# Patient Record
Sex: MALE | Race: WHITE | NOT HISPANIC OR LATINO | Employment: FULL TIME | URBAN - METROPOLITAN AREA
[De-identification: names, ages, dates, MRNs, and addresses within clinical notes are randomized per-mention and may not be internally consistent; named-entity substitution may affect disease eponyms.]

---

## 2018-12-17 ENCOUNTER — OFFICE VISIT (OUTPATIENT)
Dept: URGENT CARE | Facility: CLINIC | Age: 43
End: 2018-12-17
Payer: COMMERCIAL

## 2018-12-17 VITALS
RESPIRATION RATE: 18 BRPM | SYSTOLIC BLOOD PRESSURE: 140 MMHG | HEART RATE: 77 BPM | TEMPERATURE: 98.9 F | OXYGEN SATURATION: 96 % | WEIGHT: 233 LBS | HEIGHT: 72 IN | DIASTOLIC BLOOD PRESSURE: 93 MMHG | BODY MASS INDEX: 31.56 KG/M2

## 2018-12-17 DIAGNOSIS — J06.9 ACUTE URI: Primary | ICD-10-CM

## 2018-12-17 PROCEDURE — 99213 OFFICE O/P EST LOW 20 MIN: CPT | Performed by: NURSE PRACTITIONER

## 2018-12-17 RX ORDER — FLUTICASONE PROPIONATE 50 MCG
1 SPRAY, SUSPENSION (ML) NASAL 2 TIMES DAILY
Qty: 16 G | Refills: 0 | Status: SHIPPED | OUTPATIENT
Start: 2018-12-17 | End: 2019-09-23

## 2018-12-17 NOTE — PATIENT INSTRUCTIONS
-Use Neti Pot 2 times per day x 5 days, prior to using the prescribed Flonase   -Use Flonase as prescribed 2 times per day x 5 days after using Neti Pot   -Use cough medicine as per instructions on box as needed  If you were prescribed a cough medicine take it as prescribed  It may cause drowsiness so take it at home only or before bed  I-f using several medications be sure to read all of the ingredients so you are not taking too many of the same medications   -Take Tylenol/Ibuprofen as needed  -Stay hydrated, drink lots of fluids, soups, and tea with honey   -Warm salt water gargles may help with sore throat  -Use cool or warm humidifier   -Your symptoms may last up to 14 days, continue supportive therapy as needed   -Follow up with your PCP if your symptoms become worse or do not improve  -If you have difficulty breathing, can not catch your breath or feel short of breath go directly to the emergency room  Acute Cough   WHAT YOU NEED TO KNOW:   What is an acute cough? An acute cough can last up to 3 weeks  Common causes of an acute cough include a cold, allergies, or a lung infection  How is the cause of an acute cough diagnosed? Your healthcare provider will examine you and listen to your lungs  Tell your healthcare provider if you cough up any mucus, or have a fever or shortness of breath  Also tell your provider what makes the cough better or worse  Depending on your symptoms, you may need a chest x-ray  A sample of mucus may be collected and tested for infection  How is an acute cough treated? An acute cough usually goes away on its own  Ask your healthcare provider about medicines you can take to decrease your cough  You may need medicine to stop the cough, decrease swelling in your airways, or help open your airways  Medicine may also be given to help you cough up mucus  If you have an infection caused by bacteria, you may need antibiotics  What can I do to manage my cough?    · Do not smoke and stay away from others who smoke  Nicotine and other chemicals in cigarettes and cigars can cause lung damage and make your cough worse  Ask your healthcare provider for information if you currently smoke and need help to quit  E-cigarettes or smokeless tobacco still contain nicotine  Talk to your healthcare provider before you use these products  · Drink extra liquids as directed  Liquids will help thin and loosen mucus so you can cough it up  Liquids will also help prevent dehydration  Examples of good liquids to drink include water, fruit juice, and broth  Do not drink liquids that contain caffeine  Caffeine can increase your risk for dehydration  Ask your healthcare provider how much liquid to drink each day  · Rest as directed  Do not do activities that make your cough worse, such as exercise  · Use a humidifier or vaporizer  Use a cool mist humidifier or a vaporizer to increase air moisture in your home  This may make it easier for you to breathe and help decrease your cough  · Eat 2 to 5 mL of honey 2 times each day  Honey can help thin mucus and decrease your cough  · Use cough drops or lozenges  These can help decrease throat irritation and your cough  When should I seek immediate care? · You have trouble breathing or feel short of breath  · You cough up blood, or you see blood in your mucus  · You faint or feel weak or dizzy  · You have chest pain when you cough or take a deep breath  · You have new wheezing  When should I contact my healthcare provider? · You have a fever  · Your cough lasts longer than 4 weeks  · Your symptoms do not improve with treatment  · You have questions or concerns about your condition or care  CARE AGREEMENT:   You have the right to help plan your care  Learn about your health condition and how it may be treated  Discuss treatment options with your caregivers to decide what care you want to receive   You always have the right to refuse treatment  The above information is an  only  It is not intended as medical advice for individual conditions or treatments  Talk to your doctor, nurse or pharmacist before following any medical regimen to see if it is safe and effective for you  © 2017 [employer] Information is for End User's use only and may not be sold, redistributed or otherwise used for commercial purposes  All illustrations and images included in CareNotes® are the copyrighted property of A D A M , Inc  or Mcihael Francois

## 2019-09-23 ENCOUNTER — OFFICE VISIT (OUTPATIENT)
Dept: URGENT CARE | Facility: CLINIC | Age: 44
End: 2019-09-23
Payer: COMMERCIAL

## 2019-09-23 VITALS
WEIGHT: 218.8 LBS | OXYGEN SATURATION: 100 % | BODY MASS INDEX: 29.64 KG/M2 | HEART RATE: 80 BPM | SYSTOLIC BLOOD PRESSURE: 110 MMHG | DIASTOLIC BLOOD PRESSURE: 78 MMHG | RESPIRATION RATE: 18 BRPM | HEIGHT: 72 IN | TEMPERATURE: 98.9 F

## 2019-09-23 DIAGNOSIS — H60.332 ACUTE SWIMMER'S EAR OF LEFT SIDE: Primary | ICD-10-CM

## 2019-09-23 PROCEDURE — 99213 OFFICE O/P EST LOW 20 MIN: CPT | Performed by: PHYSICIAN ASSISTANT

## 2019-09-23 RX ORDER — OFLOXACIN 3 MG/ML
10 SOLUTION AURICULAR (OTIC) DAILY
Qty: 5 ML | Refills: 0 | Status: SHIPPED | OUTPATIENT
Start: 2019-09-23 | End: 2019-09-30

## 2019-09-23 NOTE — PATIENT INSTRUCTIONS
rx ofloxicin sent via EMR, 10 drops in L ear once daily x 7 days  Tylenol/advil as needed for pain  Follow up with PCP in 3-5 days  Proceed to  ER if symptoms worsen  Otitis Externa   WHAT YOU NEED TO KNOW:   Otitis externa, or swimmer's ear, is an infection in the outer ear canal  This canal goes from the outside of the ear to the eardrum  DISCHARGE INSTRUCTIONS:   Return to the emergency department if:   · You have severe ear pain  · You are suddenly unable to hear at all  · You have new swelling in your face, behind your ears, or in your neck  · You suddenly cannot move part of your face  · Your face suddenly feels numb  Contact your healthcare provider if:   · You have a fever  · Your signs and symptoms do not get better after 2 days of treatment  · Your signs and symptoms go away for a time, but then come back  · You have questions or concerns about your condition or care  Medicines:   · NSAIDs , such as ibuprofen, help decrease swelling, pain, and fever  This medicine is available with or without a doctor's order  NSAIDs can cause stomach bleeding or kidney problems in certain people  If you take blood thinner medicine, always ask if NSAIDs are safe for you  Always read the medicine label and follow directions  Do not give these medicines to children under 10months of age without direction from your child's healthcare provider  · Acetaminophen  decreases pain and fever  It is available without a doctor's order  Ask how much to take and how often to take it  Follow directions  Acetaminophen can cause liver damage if not taken correctly  · Ear drops  that contain an antibiotic may be given  The antibiotic helps treat a bacterial infection  You may also be given steroid medicine  The steroid helps decrease redness, swelling, and pain  · Take your medicine as directed    Contact your healthcare provider if you think your medicine is not helping or if you have side effects  Tell him or her if you are allergic to any medicine  Keep a list of the medicines, vitamins, and herbs you take  Include the amounts, and when and why you take them  Bring the list or the pill bottles to follow-up visits  Carry your medicine list with you in case of an emergency  Follow up with your healthcare provider as directed:  Write down your questions so you remember to ask them during your visits  How to use eardrops:   · Lie down on your side with your infected ear facing up  · Carefully drip the correct number of eardrops into your ear  Have another person help you if possible  · Gently move the outside part of your ear back and forth to help the medicine reach your ear canal      · Stay lying down in the same position (with your ear facing up) for 3 to 5 minutes  Prevent otitis externa:   · Do not put cotton swabs or foreign objects in your ears  · Wrap a clean moist washcloth around your finger, and use it to clean your outer ear and remove extra ear wax  · Use ear plugs when you swim  Dry your outer ears completely after you swim or bathe  © 2017 [facility] Information is for End User's use only and may not be sold, redistributed or otherwise used for commercial purposes  All illustrations and images included in CareNotes® are the copyrighted property of A D A M , Inc  or Michael Francois  The above information is an  only  It is not intended as medical advice for individual conditions or treatments  Talk to your doctor, nurse or pharmacist before following any medical regimen to see if it is safe and effective for you

## 2019-09-23 NOTE — PROGRESS NOTES
[facility]    NAME: Lance Johns is a 45 y o  male  : 1975    MRN: [id number]  DATE: 2019  TIME: 12:08 PM    Assessment and Plan   Acute swimmer's ear of left side [H60 332]  1  Acute swimmer's ear of left side  ofloxacin (FLOXIN OTIC) 0 3 % otic solution         Patient Instructions   rx ofloxicin sent via EMR, 10 drops in L ear once daily x 7 days  Tylenol/advil as needed for pain  Follow up with PCP in 3-5 days  Proceed to  ER if symptoms worsen  Chief Complaint     Chief Complaint   Patient presents with   [employer]     Swimming in [place] - L ear pain x 4 days and noted ear canal feeling wet  No fever  No OTC meds  History of Present Illness       Jaime Howe is a 44 y/o male who presents to the clinic c/o L ear pain x 4 days  Patient states that he was swimming in [place] last week and when he returned home started to notice the L ear pain, mild at first and now more sharp  He also states that last night after laying with L ear down, when he got up he noticed pillow wet, no color to drainage  He denies any fever, chills, hearing difficulties, throat pain, or R ear pain  He has h/o L ear problems for which he sees ENT for, so he didn't want to let this possible infection go due to his history  Review of Systems   Review of Systems   Constitutional: Negative for chills and fever  HENT: Positive for ear discharge and ear pain  Negative for congestion and sore throat  Respiratory: Negative for cough and shortness of breath  Neurological: Negative for dizziness, light-headedness and headaches       Current Medications       Current Outpatient Medications:     ofloxacin (FLOXIN OTIC) 0 3 % otic solution, Administer 10 drops into the left ear daily for 7 days, Disp: 5 mL, Rfl: 0    Current Allergies     Allergies as of 2019    (No Known Allergies)            The following portions of the patient's history were reviewed and updated as appropriate: allergies, current medications, past family history, past medical history, past social history, past surgical history and problem list      Past Medical History:   Diagnosis Date    Allergic rhinitis     Otitis media        Past Surgical History:   Procedure Laterality Date    HERNIA REPAIR         History reviewed  No pertinent family history  Medications have been verified  Objective   /78 (BP Location: Left arm, Patient Position: Sitting, Cuff Size: Standard)   Pulse 80   Temp 98 9 °F (37 2 °C) (Tympanic)   Resp 18   Ht 6' (1 829 m)   Wt 99 2 kg (218 lb 12 8 oz)   SpO2 100%   BMI 29 67 kg/m²        Physical Exam     Physical Exam   Constitutional: He appears well-developed and well-nourished  No distress  HENT:   Right Ear: Hearing, tympanic membrane, external ear and ear canal normal    Left Ear: Hearing, tympanic membrane and external ear normal  No lacerations  No drainage or swelling  No foreign bodies  Tympanic membrane is not erythematous and not bulging  No middle ear effusion  L ear canal erythematous   Cardiovascular: Normal rate, regular rhythm and normal heart sounds  Pulmonary/Chest: Effort normal and breath sounds normal    Nursing note and vitals reviewed

## 2020-07-29 ENCOUNTER — TELEPHONE (OUTPATIENT)
Dept: FAMILY MEDICINE CLINIC | Facility: CLINIC | Age: 45
End: 2020-07-29

## 2021-03-01 ENCOUNTER — NURSE TRIAGE (OUTPATIENT)
Dept: OTHER | Facility: OTHER | Age: 46
End: 2021-03-01

## 2021-03-01 DIAGNOSIS — Z20.822 SUSPECTED SEVERE ACUTE RESPIRATORY SYNDROME CORONAVIRUS 2 (SARS-COV-2) INFECTION: ICD-10-CM

## 2021-03-01 DIAGNOSIS — Z20.822 SUSPECTED SEVERE ACUTE RESPIRATORY SYNDROME CORONAVIRUS 2 (SARS-COV-2) INFECTION: Primary | ICD-10-CM

## 2021-03-01 PROCEDURE — U0003 INFECTIOUS AGENT DETECTION BY NUCLEIC ACID (DNA OR RNA); SEVERE ACUTE RESPIRATORY SYNDROME CORONAVIRUS 2 (SARS-COV-2) (CORONAVIRUS DISEASE [COVID-19]), AMPLIFIED PROBE TECHNIQUE, MAKING USE OF HIGH THROUGHPUT TECHNOLOGIES AS DESCRIBED BY CMS-2020-01-R: HCPCS | Performed by: FAMILY MEDICINE

## 2021-03-01 PROCEDURE — U0005 INFEC AGEN DETEC AMPLI PROBE: HCPCS | Performed by: FAMILY MEDICINE

## 2021-03-01 NOTE — TELEPHONE ENCOUNTER
Reason for Disposition   [1] COVID-19 infection suspected by caller or triager AND [2] mild symptoms (cough, fever, or others) AND [8] no complications or SOB    Protocols used: CORONAVIRUS (COVID-19) DIAGNOSED OR SUSPECTED-ADULT-[place]

## 2021-03-01 NOTE — TELEPHONE ENCOUNTER
Regarding: covid symptomatic - fever  ----- Message from [employer] sent at 3/1/2021  1:38 PM EST -----  covid test requested - patient has fever, stuffy headache and low energy   No known exposure but works in [place] [employer] so he's around a lot of people

## 2021-03-01 NOTE — TELEPHONE ENCOUNTER
1   Were you within 6 feet or less, for up to 15 minutes or more with a person that has a confirmed COVID-19 test?    Denied known exposure  2  What was the date of your exposure? N/A    3  Are you experiencing any symptoms attributed to the virus?  (Assess for SOB, cough, fever, difficulty breathing) Started 2/27/2021  Headache  Fatigue  Nasal and sinus congestion  Intermittent fever  99 9 (temporal) @ 1000 today  4   HIGH RISK: Do you have any history heart or lung conditions, weakened immune system, diabetes, Asthma, CHF, HIV, COPD, Chemo, renal failure, sickle cell, etc?   Denied

## 2021-03-02 ENCOUNTER — TELEPHONE (OUTPATIENT)
Dept: OTHER | Facility: OTHER | Age: 46
End: 2021-03-02

## 2021-03-02 LAB — SARS-COV-2 RNA RESP QL NAA+PROBE: NEGATIVE

## 2021-03-02 NOTE — TELEPHONE ENCOUNTER
The patient was called for notification of a test result for COVID-19  The patient did not answer the phone and a voicemail was left requesting a call back to [phone], Option 7

## 2021-03-03 ENCOUNTER — OFFICE VISIT (OUTPATIENT)
Dept: URGENT CARE | Facility: CLINIC | Age: 46
End: 2021-03-03
Payer: COMMERCIAL

## 2021-03-03 ENCOUNTER — TELEPHONE (OUTPATIENT)
Dept: OTHER | Facility: OTHER | Age: 46
End: 2021-03-03

## 2021-03-03 VITALS
HEART RATE: 83 BPM | HEIGHT: 72 IN | DIASTOLIC BLOOD PRESSURE: 75 MMHG | SYSTOLIC BLOOD PRESSURE: 128 MMHG | OXYGEN SATURATION: 98 % | WEIGHT: 234 LBS | BODY MASS INDEX: 31.69 KG/M2 | TEMPERATURE: 97.5 F | RESPIRATION RATE: 16 BRPM

## 2021-03-03 DIAGNOSIS — Z76.89 RETURN TO WORK EVALUATION: Primary | ICD-10-CM

## 2021-03-03 PROCEDURE — 99213 OFFICE O/P EST LOW 20 MIN: CPT | Performed by: PHYSICIAN ASSISTANT

## 2021-03-03 PROCEDURE — 3008F BODY MASS INDEX DOCD: CPT | Performed by: PHYSICIAN ASSISTANT

## 2021-03-03 NOTE — PROGRESS NOTES
[email or web address]        NAME: Cora Cheadle is a 39 y o  male  : 1975    MRN: [id number]  DATE: March 3, 2021  TIME: 3:10 PM    Assessment and Plan   Return to work evaluation [Z76 89]  1  Return to work evaluation         Patient Instructions     COVID 19 testing from 3/1/2021 reviewed, negative  As pt has had no recent exposures to COVID 19 positive persons and has had negative covid 19 testing, he may return to work  Work note provided  All questions answered  Precautions given  Follow up with PCP in 3-5 days  Proceed to  ER if symptoms worsen  Chief Complaint     Chief Complaint   Patient presents with    COVID-19     Evaluation for post covid exposure and work note  History of Present Illness       38 y/o male presenting for return to work note  Pt states he began to feel unwell 3 days ago with fever, tmax 100, fatigue, headache, and body aches  He reports slight and intermittent NC and runny nose  No chills, sweats, ear pain, sore throat, cough, or stomach upset  Reports fever last 24 hours and has since been feeling better  He had COVID 19 testing on day following sx onset that was negative  Requires an in office evaluation and note to return to work  Denies any exposure to COVID 19 positive persons  No recent travel  Review of Systems   Review of Systems   Constitutional: Positive for fatigue and fever  Negative for chills and diaphoresis  HENT: Positive for congestion and rhinorrhea  Negative for ear pain and sore throat  Respiratory: Negative for cough, shortness of breath and wheezing  Gastrointestinal: Negative for abdominal pain, diarrhea, nausea and vomiting  Musculoskeletal: Positive for myalgias  Neurological: Positive for headaches  Negative for dizziness  Current Medications     No current outpatient medications on file      Current Allergies     Allergies as of 2021    (No Known Allergies)            The following portions of the patient's history were reviewed and updated as appropriate: allergies, current medications, past family history, past medical history, past social history, past surgical history and problem list      Past Medical History:   Diagnosis Date    Allergic rhinitis     Otitis media        Past Surgical History:   Procedure Laterality Date    HERNIA REPAIR         Family History   Problem Relation Age of Onset    Asthma Mother          Medications have been verified  Objective   /75   Pulse 83   Temp 97 5 °F (36 4 °C)   Resp 16   Ht 6' (1 829 m)   Wt 106 kg (234 lb)   SpO2 98%   BMI 31 74 kg/m²   No LMP for male patient  Physical Exam     Physical Exam  Vitals signs and nursing note reviewed  Constitutional:       General: He is not in acute distress  Appearance: He is well-developed  He is not ill-appearing or diaphoretic  HENT:      Head: Normocephalic and atraumatic  Right Ear: Hearing, tympanic membrane, ear canal and external ear normal       Left Ear: Hearing, tympanic membrane, ear canal and external ear normal       Nose: Nose normal       Mouth/Throat:      Mouth: Mucous membranes are not pale, not dry and not cyanotic  No oral lesions  Pharynx: Uvula midline  No oropharyngeal exudate, posterior oropharyngeal erythema or uvula swelling  Tonsils: No tonsillar abscesses  Eyes:      General: Lids are normal          Right eye: No discharge  Left eye: No discharge  Conjunctiva/sclera: Conjunctivae normal       Right eye: No exudate  Left eye: No exudate  Neck:      Musculoskeletal: Neck supple  No edema, erythema or neck rigidity  Trachea: Trachea and phonation normal  No tracheal tenderness  Cardiovascular:      Rate and Rhythm: Normal rate and regular rhythm  Heart sounds: Normal heart sounds  Heart sounds not distant  Pulmonary:      Effort: Pulmonary effort is normal  No respiratory distress  Breath sounds: Normal breath sounds   No stridor  No decreased breath sounds, wheezing, rhonchi or rales  Lymphadenopathy:      Cervical: No cervical adenopathy  Skin:     General: Skin is warm and dry  Coloration: Skin is not pale  Findings: No erythema or rash  Neurological:      General: No focal deficit present  Mental Status: He is alert  He is not disoriented  Cranial Nerves: No cranial nerve deficit  Coordination: Coordination normal       Gait: Gait normal    Psychiatric:         Attention and Perception: Attention normal          Behavior: Behavior normal  Behavior is cooperative  Thought Content:  Thought content normal          Judgment: Judgment normal

## 2021-03-03 NOTE — LETTER
March 3, 2021    Patient:  Mary Wallace  YOB: 1975  Date of Last Encounter: 3/3/2021    To whom it may concern:    Mary Wallace was first evaluated on 3/1/2021 and tested negative for COVID-19 (Coronavirus)  He was advised to remain at home in quarantine pending these results  He was seen in our office on 3/3/2021 and may return to work on 3/4/2021        Sincerely,        Emily Spatz, PA-C

## 2021-03-03 NOTE — TELEPHONE ENCOUNTER
Patient states he needs proof of the conversation he had with a healthcall nurse on Monday 3/1 in order to return to work  His employer is not satisfied with the results of the actual covid swab test which [facility]  Patient states he got the swab at the care now in [place] on Monday  Patient states his employer is threatening to make him take 3 days unpaid unless he can get evidence of this phone encounter with the nurse  Please give him a call 
Pt advised to log into [employer] from a computer to view and print after visit summary from [facility] encounter  Pt was able to log in and find the additional documentation to present to work 
19-Dec-2018

## 2021-04-09 RX ORDER — SODIUM CHLORIDE 9 MG/ML
INJECTION, SOLUTION INTRAVENOUS
COMMUNITY
End: 2021-04-12 | Stop reason: ALTCHOICE

## 2021-04-09 RX ORDER — DICYCLOMINE HYDROCHLORIDE 10 MG/1
CAPSULE ORAL EVERY 8 HOURS
COMMUNITY
End: 2021-04-12 | Stop reason: ALTCHOICE

## 2021-04-12 ENCOUNTER — OFFICE VISIT (OUTPATIENT)
Dept: FAMILY MEDICINE CLINIC | Facility: CLINIC | Age: 46
End: 2021-04-12
Payer: COMMERCIAL

## 2021-04-12 VITALS
HEIGHT: 72 IN | HEART RATE: 68 BPM | BODY MASS INDEX: 31.83 KG/M2 | DIASTOLIC BLOOD PRESSURE: 70 MMHG | RESPIRATION RATE: 16 BRPM | SYSTOLIC BLOOD PRESSURE: 122 MMHG | OXYGEN SATURATION: 98 % | WEIGHT: 235 LBS

## 2021-04-12 DIAGNOSIS — E55.9 VITAMIN D DEFICIENCY: ICD-10-CM

## 2021-04-12 DIAGNOSIS — Z28.21 COVID-19 VIRUS VACCINATION DECLINED: ICD-10-CM

## 2021-04-12 DIAGNOSIS — Z00.00 WELL ADULT EXAM: Primary | ICD-10-CM

## 2021-04-12 DIAGNOSIS — Z79.899 OTHER LONG TERM (CURRENT) DRUG THERAPY: ICD-10-CM

## 2021-04-12 DIAGNOSIS — Z11.4 SCREENING FOR HIV (HUMAN IMMUNODEFICIENCY VIRUS): ICD-10-CM

## 2021-04-12 DIAGNOSIS — E66.09 CLASS 1 OBESITY DUE TO EXCESS CALORIES WITH SERIOUS COMORBIDITY AND BODY MASS INDEX (BMI) OF 31.0 TO 31.9 IN ADULT: ICD-10-CM

## 2021-04-12 DIAGNOSIS — K59.4 ANAL SPHINCTER SPASM: ICD-10-CM

## 2021-04-12 DIAGNOSIS — E53.8 B12 DEFICIENCY: ICD-10-CM

## 2021-04-12 PROCEDURE — 1036F TOBACCO NON-USER: CPT | Performed by: FAMILY MEDICINE

## 2021-04-12 PROCEDURE — 99386 PREV VISIT NEW AGE 40-64: CPT | Performed by: FAMILY MEDICINE

## 2021-04-12 PROCEDURE — 3725F SCREEN DEPRESSION PERFORMED: CPT | Performed by: FAMILY MEDICINE

## 2021-04-12 NOTE — PROGRESS NOTES
Assessment/Plan:    1  Well adult exam  -     TSH, 3rd generation with Free T4 reflex; Future; Expected date: 04/12/2021  -     Lipid panel; Future; Expected date: 04/12/2021  -     Comprehensive metabolic panel; Future; Expected date: 04/12/2021  -     CBC; Future; Expected date: 04/12/2021  -     UA w Reflex to Microscopic w Reflex to Culture; Future; Expected date: 04/12/2021    2  Class 1 obesity due to excess calories with serious comorbidity and body mass index (BMI) of 31 0 to 31 9 in adult  Comments:  happier with 25lbs lighter   lack of motivation this year   lots of sugar feels very very fatigued   Orders:  -     TSH, 3rd generation with Free T4 reflex; Future; Expected date: 04/12/2021    3  Anal sphincter spasm  -     Ambulatory referral to Colorectal Surgery; Future  -     TSH, 3rd generation with Free T4 reflex; Future; Expected date: 04/12/2021    4  Screening for HIV (human immunodeficiency virus)  -     HIV 1/2 Antigen/Antibody (4th Generation) w Reflex [facility]; Future    5  Vitamin D deficiency  -     Vitamin D 25 hydroxy; Future; Expected date: 04/12/2021    6  B12 deficiency  -     Vitamin B12; Future; Expected date: 04/12/2021    7  Other long term (current) drug therapy  -     TSH, 3rd generation with Free T4 reflex; Future; Expected date: 04/12/2021  -     Hemoglobin A1C; Future; Expected date: 04/12/2021    8  COVID-19 virus vaccination declined         Subjective:      Patient ID: Raghav Lowery is a 39 y o  male  HPI   Here for yearly  c/o  Looser not a large amount of stool  2-3 BM daily   Does have anal sphincter spasm   Weight increased  25lbs       The following portions of the patient's history were reviewed and updated as appropriate: allergies, current medications, past family history, past medical history, past social history, past surgical history and problem list     Review of Systems   Constitutional: Negative for activity change, appetite change and fever     HENT: Negative for congestion, nosebleeds and trouble swallowing  Eyes: Negative for itching  Respiratory: Negative for cough and chest tightness  Cardiovascular: Negative for chest pain and palpitations  Gastrointestinal: Negative for abdominal pain, constipation, diarrhea and nausea  Endocrine: Negative for cold intolerance  Genitourinary: Negative for frequency  Musculoskeletal: Negative for gait problem and joint swelling  Skin: Negative for rash  Allergic/Immunologic: Negative for immunocompromised state  Neurological: Negative for dizziness, tremors, seizures, syncope and headaches  Psychiatric/Behavioral: Negative for hallucinations and suicidal ideas  Objective:      /70 (BP Location: Left arm, Patient Position: Sitting, Cuff Size: Large)   Pulse 68   Resp 16   Ht 6' (1 829 m)   Wt 107 kg (235 lb)   SpO2 98%   BMI 31 87 kg/m²     No visits with results within 2 Week(s) from this visit  Latest known visit with results is:   Orders Only on 03/01/2021   Component Date Value    SARS-CoV-2 03/01/2021 Negative           Physical Exam  Vitals signs and nursing note reviewed  Constitutional:       General: He is not in acute distress  Appearance: He is well-developed  HENT:      Head: Normocephalic and atraumatic  Neck:      Musculoskeletal: Normal range of motion and neck supple  Cardiovascular:      Rate and Rhythm: Normal rate and regular rhythm  Heart sounds: Normal heart sounds  No murmur  Pulmonary:      Effort: Pulmonary effort is normal       Breath sounds: Normal breath sounds  No wheezing or rales  Abdominal:      General: Bowel sounds are normal  There is no distension  Palpations: Abdomen is soft  Tenderness: There is no abdominal tenderness  There is no guarding or rebound  Musculoskeletal: Normal range of motion  General: No tenderness  Lymphadenopathy:      Cervical: No cervical adenopathy     Skin:     General: Skin is warm and dry       Capillary Refill: Capillary refill takes less than 2 seconds  Findings: No rash  Neurological:      Mental Status: He is alert and oriented to person, place, and time  Cranial Nerves: No cranial nerve deficit  Sensory: No sensory deficit  Motor: No abnormal muscle tone  Psychiatric:         Behavior: Behavior normal          Thought Content: Thought content normal          Judgment: Judgment normal          BMI Counseling: Body mass index is 31 87 kg/m²  The BMI is above normal  Nutrition recommendations include decreasing portion sizes, encouraging healthy choices of fruits and vegetables and limiting drinks that contain sugar  Exercise recommendations include moderate physical activity 150 minutes/week  No pharmacotherapy was ordered             Seth Alvarez MD  [place] [id number]

## 2021-05-10 ENCOUNTER — AMB VIDEO VISIT (OUTPATIENT)
Dept: OTHER | Facility: HOSPITAL | Age: 46
End: 2021-05-10

## 2021-05-10 PROCEDURE — ECARE PR SL URGENT CARE VIRTUAL VISIT: Performed by: FAMILY MEDICINE

## 2021-05-10 NOTE — CARE ANYWHERE EVISITS
Visit Summary for Tonie Krueger - Gender: Male - Date of Birth: [id number]  Date: [id number] - Duration: 8 minutes  Patient: Tonie Krueger  Provider: Jet Watson    Patient Contact Information  Address  [address]; [address]  [phone]    Visit Topics  Covid and tick bite [Added By: Self - 2021-05-10]    Triage Questions   What is your current physical address in the event of a medical emergency? Answer []  Are you allergic to any medications? Answer []  Are you now or could you be pregnant? Answer []  Do you have any immune system compromise or chronic lung   disease? Answer []  Do you have any vulnerable family members in the home (infant, pregnant, cancer, elderly)? Answer []     Conversation Transcripts  [0A][0A] [Notification] You are connected with Jet Watson, Family Physician [0A][Notification] Ciaran Lindsay is located in [place]  [0A][Notification] Ciaran Lindsay has shared health history  Carlos Connor  [0A]    Diagnosis  Dyspnea, unspecified  Other fatigue  Headache  Contact w and exposure to oth viral communicable diseases  Bit/stung by nonvenom insect & oth nonvenom arthropods, init    Procedures  Value: [id number] Code: CPT-4 UNLISTED E&M SERVICE    Medications Prescribed    No prescriptions ordered    Provider Notes  [0A][0A] We strongly encourage you to share the following record of today's visit with your primary care physician  [0A]Web visit[0A]Patient calling from: [place][0A] [0A]CC/HPI: Feels sick since yesterday with a gigantic headache  Feels cold-like symptoms  He is out of breath for a minute going down and then up the stairs  Tmax 99  A little cough, no chest pain  No ageusia or anosmia  He is stuffy  Feels gassy but no diarrhea  No vomiting or nausea  He worries about covid-19  Work has isolation guidelines  [0A]History of Covid-19: no  [0A]History of Covid-19 testin months ago, negative   [0A]Covid-19 vaccination: Fully vaccinated as of: No  [0A]Had a tick bite 2 days ago, pulled it out  The spot has bee itchy since then  It was easy to pull out, not   engorged at all  He was outside working that day and the day before  [place]  [0A]  [0A]Allergies: nkda  [0A] [0A]Meds: None  [0A]Pmx: None  [0A]Psh: hernia as a child  [0A]Fever: No, temp 99  [0A]Weight: N/A[0A] [0A]Gen: A&OX3, NAD,   normal mental status and interaction, not toxic  [0A]LN: no cervical lymphadenopathy [0A]Lungs: Clear, normal respirations, no cough or wheezing  On demand, a few dry coughs  [0A]Skin: A small red dot at the site of the tick attachment  [0A]Assessment:    [0A]Acute viral infection  Covid-19 suspected  Go for covid testing on day 3 (tomorrow)  Tick bite - does not meet criteria for doxycycline prophylaxis  [0A]Plan: Symptomatic management, testing (preferably on day 3 to 5 of illness),   isolation/quarantine  [0A]Isolation information:[0A]The patient is instructed to observe isolation until the following conditions have been met: It has been at least 10 days since symptoms first appeared; it has been at least 24 hours with no fever   (without fever-reducing medication), and symptoms have improved[0A]Quarantine instructions for  household members/close contacts who don't have and donât develop symptoms:[0A]Stay home for 14 days after the last contact with a person with   Covid-19 [0A]How to interpret the lab results:[0A]Positive test  If you test positive for COVID-19 by a viral test, you are probably sick with Covid-19  Know what protective steps to take if you are sick or caring for someone  DishTag es test  Â­Â­If you test negative for COVID-19 by a viral test, you probably were not infected at the time your sample was collected  However, that does not mean you will not   get sick  The test result only means that you did not have COVID-19 at the time of testing   You might test negative if the sample was collected early in your infection and test positive later during your illness  You could also be exposed to COVID-19   after the test and get infected then  [0A]Recommendations for symptom relief  [0A]Eyes:[0A]Warm compresses  [0A]Dispose of contact lenses and eye makeup if applicable  Do not wear until infection has resolved  [0A]Saline eye drops or lubricants can be very   soothing  Example: Refresh saline eye drops  [0A]Rest, drink plenty of fluids  [0A]Saline rinses such as Neti pot once a day or saline nasal spray if this is not tolerated  [0A]Fluticasone nasal spray: 2 sprays each nostril once a day  [0A]Honey or hot tea   (decaffeinated) with honey for sore throat  Also helps with cough  [0A]For GI symptoms:[0A]Diet: [place], small portions  [0A]Hydration: Frequent intake of small amounts of fluid  Electrolyte solution is recommended  [0A]Diarrhea: Loperamide/ Simethicone   (Imodium multi-symptom relief) and probiotics  [0A]Acetaminophen as needed for pain, fever or body aches  [0A]Guaifenesin (Plain mucinex)  for chest congestion  [0A]Dextromethorphan (Delsym) for cough  [0A]If you chose to take over the counter cold/flu   medications , please  aware that many of these medications many contain Acetaminophen, Ibuprofen, guaifenesin, dextromethorphan and other ingredients listed above  Please read the label [0A]Recommendations to prevent spread of covid-19:[0A]Observe social   distancing, stay home whenever possible  CDC recommends that everyone over the age of two wear a face covering in public unless contraindicated  [0A]Follow up: Please reconnect for a visit in you don't feel better in a few days  Please connect sooner if   you feel worse, particularly if you are unable to manage a fever or remain hydrated  [0A]You should go to the emergency room if you have persistent or worsening chest pain or shortness of breath, or if you feel you have an emergency in general  [0A]If you   are not sure how to proceed, feel free to connect for a visit at any time  [0A]Discussed precautions  Voiced understanding and agreement with the plan  [0A]Coronavirus information from   [employer]  org:[0A]https://[employer]  org/condition/coronavirus/[0A]Coronavirus information from the http://[employer]/ Disease Patient Information Lyme disease is caused by bacteria called Borrelia   burgdorferi that is only transmitted to humans when they are bitten by an infected tick  To infect its host, a tick typically must be attached to the skin for at least 36 hours  Most cases of Lyme disease occur in late spring and early summer  The   most common symptoms of Lyme disease include a red, circular âbulls-eyeâ rash often accompanied by muscle and joint aches  About 70 to 80 percent of people infected develop the rash, which shows up several days to weeks after the tick bite  Lyme   disease is diagnosed by medical history, physical exam, and sometimes a blood test  It may take four to six weeks for the human immune system to make antibodies against Borrelia burgdorferi and therefore show up in a positive blood test  That is why   patients with the Lyme rash usually have a negative blood test and diagnosis is based on the characteristic appearance of the rash  Patients with other clinical manifestations such as Lyme arthritis will usually have a blood test   Anyone who has   symptoms for longer than six weeks and who has never been treated with antibiotics is unlikely to have Lyme disease if the blood test is negative  Most cases of Lyme disease are successfully treated with a few weeks of antibiotics  Using antibiotics   for a very long time (months or years) does not offer superior results and in fact can be dangerous, because it can cause potentially fatal complications  The best treatment for Lyme disease is prevention: Be cautious when walking in the woods,   avoiding bushy and grassy areas   Wear long pants and long-sleeved shirts and wear insect repellent containing DEET on exposed skin  After walking in wooded areas, thoroughly check the skin for the poppy-seed sized ticks, paying particular attention to   the scalp, armpits and groin  If you find a tick, carefully remove it with tweezers  [0A]We encourage you to follow up with your primary care physician on a regular basis  [0A][0A]    Electronically signed bySarah Jacobs(NPI [id number])

## 2023-01-12 NOTE — PROGRESS NOTES
[facility]        NAME: Tommy Carrillo is a 41 y o  male  : 1975    MRN: [id number]  DATE: 2018  TIME: 2:17 PM    Assessment and Plan   Acute URI [J06 9]  1  Acute URI  fluticasone (FLONASE) 50 mcg/act nasal spray         Patient Instructions     -Use Neti Pot 2 times per day x 5 days, prior to using the prescribed Flonase   -Use Flonase as prescribed 2 times per day x 5 days after using Neti Pot   -Use cough medicine as per instructions on box as needed  If you were prescribed a cough medicine take it as prescribed  It may cause drowsiness so take it at home only or before bed  I-f using several medications be sure to read all of the ingredients so you are not taking too many of the same medications   -Take Tylenol/Ibuprofen as needed  -Stay hydrated, drink lots of fluids, soups, and tea with honey   -Warm salt water gargles may help with sore throat  -Use cool or warm humidifier   -Your symptoms may last up to 14 days, continue supportive therapy as needed   -Follow up with your PCP if your symptoms become worse or do not improve  -If you have difficulty breathing, can not catch your breath or feel short of breath go directly to the emergency room  Follow up with PCP in 3-5 days  Proceed to  ER if symptoms worsen  Chief Complaint     Chief Complaint   Patient presents with    Cold Like Symptoms     cold like symptoms for 7 days, conjested, cough, heaviness in chest woth a deep breath         History of Present Illness       41year old male presents with URI symptoms for 7 days  Entire family has been sick  He complains of congestion, cough, lethargy, dry throat  He denies fevers, wheezing, CP, SOB, and chills  Has not tried anything OTC  Does not take any medicaitons  Review of Systems   Review of Systems   Constitutional: Positive for fatigue  Negative for chills and fever  HENT: Positive for congestion, postnasal drip, rhinorrhea and sore throat  Negative for ear pain, sinus pain and sinus pressure  Respiratory: Positive for cough  Negative for chest tightness, shortness of breath and wheezing  Cardiovascular: Negative for chest pain  Gastrointestinal: Negative for nausea and vomiting  Current Medications       Current Outpatient Prescriptions:     fluticasone (FLONASE) 50 mcg/act nasal spray, 1 spray into each nostril 2 (two) times a day for 5 days, Disp: 16 g, Rfl: 0    Current Allergies     Allergies as of 12/17/2018    (No Known Allergies)            The following portions of the patient's history were reviewed and updated as appropriate: allergies, current medications, past family history, past medical history, past social history, past surgical history and problem list      History reviewed  No pertinent past medical history  Past Surgical History:   Procedure Laterality Date    HERNIA REPAIR         History reviewed  No pertinent family history  Medications have been verified  Objective   /93   Pulse 77   Temp 98 9 °F (37 2 °C)   Resp 18   Ht 6' (1 829 m)   Wt 106 kg (233 lb)   SpO2 96%   BMI 31 60 kg/m²        Physical Exam     Physical Exam   Constitutional: He is oriented to person, place, and time  He appears well-developed and well-nourished  HENT:   Right Ear: Tympanic membrane, external ear and ear canal normal    Left Ear: Tympanic membrane, external ear and ear canal normal    Nose: Mucosal edema, rhinorrhea and septal deviation present  Mouth/Throat: Posterior oropharyngeal erythema present  No posterior oropharyngeal edema  Cardiovascular: Normal rate, regular rhythm and normal heart sounds  Pulmonary/Chest: Effort normal and breath sounds normal    Neurological: He is alert and oriented to person, place, and time  Skin: Skin is warm and dry  Nursing note and vitals reviewed  Abormal VS: Temp > 100F or < 96.8F; SBP < 90 mmHG; HR > 120bpm; Resp > 24/min

## 2023-02-15 ENCOUNTER — TELEPHONE (OUTPATIENT)
Dept: FAMILY MEDICINE CLINIC | Facility: CLINIC | Age: 48
End: 2023-02-15

## 2025-02-14 ENCOUNTER — TELEMEDICINE (OUTPATIENT)
Dept: OTHER | Facility: HOSPITAL | Age: 50
End: 2025-02-14
Payer: COMMERCIAL

## 2025-02-14 VITALS — HEART RATE: 70 BPM | OXYGEN SATURATION: 95 % | WEIGHT: 205 LBS | HEIGHT: 71 IN | BODY MASS INDEX: 28.7 KG/M2

## 2025-02-14 DIAGNOSIS — J06.9 VIRAL URI WITH COUGH: Primary | ICD-10-CM

## 2025-02-14 PROCEDURE — 99214 OFFICE O/P EST MOD 30 MIN: CPT | Performed by: PHYSICIAN ASSISTANT

## 2025-02-14 RX ORDER — BENZONATATE 200 MG/1
200 CAPSULE ORAL 3 TIMES DAILY PRN
Qty: 20 CAPSULE | Refills: 0 | Status: SHIPPED | OUTPATIENT
Start: 2025-02-14

## 2025-02-14 RX ORDER — ALBUTEROL SULFATE 90 UG/1
1 INHALANT RESPIRATORY (INHALATION) EVERY 4 HOURS PRN
Qty: 6.7 G | Refills: 0 | Status: SHIPPED | OUTPATIENT
Start: 2025-02-14

## 2025-02-14 NOTE — PROGRESS NOTES
Virtual Regular Visit  Name: Fracisco Krueger      : 1975      MRN: [id number]  Encounter Provider: Shannon D Severino, PA-C  Encounter Date: 2025   Encounter department: VIRTUAL CARE       Verification of patient location:  Patient is located at Home in the following state in which I hold an active license [place] :  Assessment & Plan  Viral URI with cough    Orders:    benzonatate (TESSALON) 200 MG capsule; Take 1 capsule (200 mg total) by mouth 3 (three) times a day as needed for cough    albuterol (Proventil HFA) 90 mcg/act inhaler; Inhale 1 puff every 4 (four) hours as needed for wheezing    XR chest pa and lateral; Future        Encounter provider Shannon D Severino, PA-C    The patient was identified by name and date of birth. Fracisco Krueger was informed that this is a telemedicine visit and that the visit is being conducted through the Epic Embedded platform. He agrees to proceed..  My office door was closed. No one else was in the room.  He acknowledged consent and understanding of privacy and security of the video platform. The patient has agreed to participate and understands they can discontinue the visit at any time.    Patient is aware this is a billable service.     History obtained from: patient with wife in room  History of Present Illness     Pt reports had flu-like sx starting - body aches, fever, chills, nausea. Then sweated out the fever by Wednesday and started feeling better. Now sx are worsening. Has trouble taking a deep breath. If he takes a deep breath he coughs which is wheezy. Cough is dry. No CP. Has happened in the past and needed abx.       Review of Systems   Constitutional:  Negative for fever.   HENT:  Negative for nosebleeds.    Eyes:  Negative for redness.   Respiratory:  Positive for cough, chest tightness and wheezing. Negative for shortness of breath.    Cardiovascular:  Negative for chest pain.   Gastrointestinal:  Negative for blood in stool.   Genitourinary:   Negative for hematuria.   Musculoskeletal:  Negative for gait problem.   Skin:  Negative for rash.   Neurological:  Negative for seizures.   Psychiatric/Behavioral:  Negative for behavioral problems.        Objective   There were no vitals taken for this visit.    Physical Exam  Constitutional:       General: He is not in acute distress.     Appearance: Normal appearance. He is not toxic-appearing.   HENT:      Head: Normocephalic and atraumatic.      Nose: No rhinorrhea.      Mouth/Throat:      Mouth: Mucous membranes are moist.   Eyes:      Conjunctiva/sclera: Conjunctivae normal.   Pulmonary:      Effort: Pulmonary effort is normal. No respiratory distress.      Breath sounds: No wheezing (no gross audible wheeze through computer).      Comments: Dry wheezy cough when attempting deep breath. Denies pleuritic pain  Musculoskeletal:      Cervical back: Normal range of motion.   Skin:     Findings: No rash (on face or neck).   Neurological:      Mental Status: He is alert.      Cranial Nerves: No dysarthria or facial asymmetry.   Psychiatric:         Mood and Affect: Mood normal.         Behavior: Behavior normal.         Visit Time  Total Visit Duration: 11 minutes not including the time spent for establishing the audio/video connection.